# Patient Record
Sex: FEMALE | Race: WHITE | Employment: PART TIME | URBAN - METROPOLITAN AREA
[De-identification: names, ages, dates, MRNs, and addresses within clinical notes are randomized per-mention and may not be internally consistent; named-entity substitution may affect disease eponyms.]

---

## 2024-10-08 PROCEDURE — 99223 1ST HOSP IP/OBS HIGH 75: CPT | Performed by: STUDENT IN AN ORGANIZED HEALTH CARE EDUCATION/TRAINING PROGRAM

## 2024-10-15 ENCOUNTER — TELEPHONE (OUTPATIENT)
Dept: PRIMARY CARE | Facility: CLINIC | Age: 64
End: 2024-10-15
Payer: COMMERCIAL

## 2024-10-23 ENCOUNTER — APPOINTMENT (OUTPATIENT)
Dept: PRIMARY CARE | Facility: CLINIC | Age: 64
End: 2024-10-23
Payer: COMMERCIAL

## 2024-10-23 ENCOUNTER — LAB (OUTPATIENT)
Dept: LAB | Facility: LAB | Age: 64
End: 2024-10-23
Payer: COMMERCIAL

## 2024-10-23 VITALS
HEIGHT: 64 IN | DIASTOLIC BLOOD PRESSURE: 84 MMHG | WEIGHT: 164 LBS | HEART RATE: 87 BPM | BODY MASS INDEX: 28 KG/M2 | SYSTOLIC BLOOD PRESSURE: 132 MMHG | OXYGEN SATURATION: 96 %

## 2024-10-23 DIAGNOSIS — Z12.31 ENCOUNTER FOR SCREENING MAMMOGRAM FOR MALIGNANT NEOPLASM OF BREAST: ICD-10-CM

## 2024-10-23 DIAGNOSIS — J96.11 CHRONIC RESPIRATORY FAILURE WITH HYPOXIA (MULTI): ICD-10-CM

## 2024-10-23 DIAGNOSIS — Z00.00 ANNUAL PHYSICAL EXAM: Primary | ICD-10-CM

## 2024-10-23 DIAGNOSIS — R60.0 BILATERAL LOWER EXTREMITY EDEMA: ICD-10-CM

## 2024-10-23 DIAGNOSIS — Z12.11 ENCOUNTER FOR SCREENING FOR MALIGNANT NEOPLASM OF COLON: ICD-10-CM

## 2024-10-23 DIAGNOSIS — I26.99 OTHER ACUTE PULMONARY EMBOLISM WITHOUT ACUTE COR PULMONALE: ICD-10-CM

## 2024-10-23 DIAGNOSIS — J44.9 CHRONIC OBSTRUCTIVE PULMONARY DISEASE, UNSPECIFIED COPD TYPE (MULTI): ICD-10-CM

## 2024-10-23 DIAGNOSIS — Z00.00 ANNUAL PHYSICAL EXAM: ICD-10-CM

## 2024-10-23 DIAGNOSIS — Z12.4 CERVICAL CANCER SCREENING: ICD-10-CM

## 2024-10-23 DIAGNOSIS — Z78.0 ENCOUNTER FOR OSTEOPOROSIS SCREENING IN ASYMPTOMATIC POSTMENOPAUSAL PATIENT: ICD-10-CM

## 2024-10-23 DIAGNOSIS — Z13.820 ENCOUNTER FOR OSTEOPOROSIS SCREENING IN ASYMPTOMATIC POSTMENOPAUSAL PATIENT: ICD-10-CM

## 2024-10-23 LAB
ALBUMIN SERPL BCP-MCNC: 3.8 G/DL (ref 3.4–5)
ALP SERPL-CCNC: 94 U/L (ref 33–136)
ALT SERPL W P-5'-P-CCNC: 40 U/L (ref 7–45)
ANION GAP SERPL CALC-SCNC: 11 MMOL/L (ref 10–20)
AST SERPL W P-5'-P-CCNC: 21 U/L (ref 9–39)
BASOPHILS # BLD AUTO: 0.03 X10*3/UL (ref 0–0.1)
BASOPHILS NFR BLD AUTO: 0.4 %
BILIRUB SERPL-MCNC: 0.5 MG/DL (ref 0–1.2)
BUN SERPL-MCNC: 16 MG/DL (ref 6–23)
CALCIUM SERPL-MCNC: 9.7 MG/DL (ref 8.6–10.6)
CHLORIDE SERPL-SCNC: 102 MMOL/L (ref 98–107)
CHOLEST SERPL-MCNC: 259 MG/DL (ref 0–199)
CHOLESTEROL/HDL RATIO: 3.1
CO2 SERPL-SCNC: 31 MMOL/L (ref 21–32)
CREAT SERPL-MCNC: 0.86 MG/DL (ref 0.5–1.05)
EGFRCR SERPLBLD CKD-EPI 2021: 75 ML/MIN/1.73M*2
EOSINOPHIL # BLD AUTO: 0.22 X10*3/UL (ref 0–0.7)
EOSINOPHIL NFR BLD AUTO: 2.7 %
ERYTHROCYTE [DISTWIDTH] IN BLOOD BY AUTOMATED COUNT: 13.8 % (ref 11.5–14.5)
GLUCOSE SERPL-MCNC: 96 MG/DL (ref 74–99)
HCT VFR BLD AUTO: 39.5 % (ref 36–46)
HDLC SERPL-MCNC: 82.3 MG/DL
HGB BLD-MCNC: 12.3 G/DL (ref 12–16)
IMM GRANULOCYTES # BLD AUTO: 0.02 X10*3/UL (ref 0–0.7)
IMM GRANULOCYTES NFR BLD AUTO: 0.2 % (ref 0–0.9)
LDLC SERPL CALC-MCNC: 158 MG/DL
LYMPHOCYTES # BLD AUTO: 0.43 X10*3/UL (ref 1.2–4.8)
LYMPHOCYTES NFR BLD AUTO: 5.2 %
MCH RBC QN AUTO: 29.1 PG (ref 26–34)
MCHC RBC AUTO-ENTMCNC: 31.1 G/DL (ref 32–36)
MCV RBC AUTO: 93 FL (ref 80–100)
MONOCYTES # BLD AUTO: 0.8 X10*3/UL (ref 0.1–1)
MONOCYTES NFR BLD AUTO: 9.7 %
NEUTROPHILS # BLD AUTO: 6.79 X10*3/UL (ref 1.2–7.7)
NEUTROPHILS NFR BLD AUTO: 81.8 %
NON HDL CHOLESTEROL: 177 MG/DL (ref 0–149)
NRBC BLD-RTO: 0 /100 WBCS (ref 0–0)
PLATELET # BLD AUTO: 290 X10*3/UL (ref 150–450)
POTASSIUM SERPL-SCNC: 4.4 MMOL/L (ref 3.5–5.3)
PROT SERPL-MCNC: 6.3 G/DL (ref 6.4–8.2)
RBC # BLD AUTO: 4.23 X10*6/UL (ref 4–5.2)
SODIUM SERPL-SCNC: 140 MMOL/L (ref 136–145)
TRIGL SERPL-MCNC: 94 MG/DL (ref 0–149)
TSH SERPL-ACNC: 3.33 MIU/L (ref 0.44–3.98)
VLDL: 19 MG/DL (ref 0–40)
WBC # BLD AUTO: 8.3 X10*3/UL (ref 4.4–11.3)

## 2024-10-23 PROCEDURE — 90471 IMMUNIZATION ADMIN: CPT | Performed by: STUDENT IN AN ORGANIZED HEALTH CARE EDUCATION/TRAINING PROGRAM

## 2024-10-23 PROCEDURE — 1159F MED LIST DOCD IN RCRD: CPT | Performed by: STUDENT IN AN ORGANIZED HEALTH CARE EDUCATION/TRAINING PROGRAM

## 2024-10-23 PROCEDURE — 36415 COLL VENOUS BLD VENIPUNCTURE: CPT

## 2024-10-23 PROCEDURE — 80061 LIPID PANEL: CPT

## 2024-10-23 PROCEDURE — 80053 COMPREHEN METABOLIC PANEL: CPT

## 2024-10-23 PROCEDURE — 3008F BODY MASS INDEX DOCD: CPT | Performed by: STUDENT IN AN ORGANIZED HEALTH CARE EDUCATION/TRAINING PROGRAM

## 2024-10-23 PROCEDURE — 90677 PCV20 VACCINE IM: CPT | Performed by: STUDENT IN AN ORGANIZED HEALTH CARE EDUCATION/TRAINING PROGRAM

## 2024-10-23 PROCEDURE — 85025 COMPLETE CBC W/AUTO DIFF WBC: CPT

## 2024-10-23 PROCEDURE — 99387 INIT PM E/M NEW PAT 65+ YRS: CPT | Performed by: STUDENT IN AN ORGANIZED HEALTH CARE EDUCATION/TRAINING PROGRAM

## 2024-10-23 PROCEDURE — 1160F RVW MEDS BY RX/DR IN RCRD: CPT | Performed by: STUDENT IN AN ORGANIZED HEALTH CARE EDUCATION/TRAINING PROGRAM

## 2024-10-23 PROCEDURE — 1123F ACP DISCUSS/DSCN MKR DOCD: CPT | Performed by: STUDENT IN AN ORGANIZED HEALTH CARE EDUCATION/TRAINING PROGRAM

## 2024-10-23 PROCEDURE — 99204 OFFICE O/P NEW MOD 45 MIN: CPT | Performed by: STUDENT IN AN ORGANIZED HEALTH CARE EDUCATION/TRAINING PROGRAM

## 2024-10-23 PROCEDURE — 84443 ASSAY THYROID STIM HORMONE: CPT

## 2024-10-23 PROCEDURE — 1036F TOBACCO NON-USER: CPT | Performed by: STUDENT IN AN ORGANIZED HEALTH CARE EDUCATION/TRAINING PROGRAM

## 2024-10-23 RX ORDER — GUAIFENESIN 600 MG/1
600 TABLET, EXTENDED RELEASE ORAL 2 TIMES DAILY
COMMUNITY
Start: 2024-10-11

## 2024-10-23 RX ORDER — FUROSEMIDE 20 MG/1
20 TABLET ORAL DAILY
Qty: 5 TABLET | Refills: 0 | Status: SHIPPED | OUTPATIENT
Start: 2024-10-23 | End: 2024-10-28

## 2024-10-23 RX ORDER — ALBUTEROL SULFATE 90 UG/1
2 INHALANT RESPIRATORY (INHALATION) EVERY 6 HOURS
COMMUNITY
Start: 2024-10-11

## 2024-10-23 RX ORDER — FLUTICASONE FUROATE, UMECLIDINIUM BROMIDE AND VILANTEROL TRIFENATATE 100; 62.5; 25 UG/1; UG/1; UG/1
1 POWDER RESPIRATORY (INHALATION) DAILY
Qty: 60 EACH | Refills: 1 | Status: SHIPPED | OUTPATIENT
Start: 2024-10-23

## 2024-10-23 RX ORDER — APIXABAN 5 MG (74)
5 KIT ORAL 2 TIMES DAILY
COMMUNITY
Start: 2024-10-11 | End: 2024-10-23 | Stop reason: WASHOUT

## 2024-10-23 RX ORDER — FLUTICASONE FUROATE, UMECLIDINIUM BROMIDE AND VILANTEROL TRIFENATATE 100; 62.5; 25 UG/1; UG/1; UG/1
1 POWDER RESPIRATORY (INHALATION)
COMMUNITY
Start: 2024-10-11 | End: 2024-10-23 | Stop reason: SDUPTHER

## 2024-10-23 NOTE — PROGRESS NOTES
Subjective   Patient ID: Estephanie Reyes is a 63 y.o. female who presents for the following    Assessment/Plan   Preventative Medicine   -Has never had age related cancer screening  -Colon cancer screening: will order colonoscopy at [facility]   -MMG: MMG ordered   -Cervical cancer screening to be done through OB   -DEXA scan: never had done. Order placed.   -Prevnar 20 to be given today     COPD  - No acute exacerbation reported  - Currently well-controlled  - Patient quit smoking  Plan  - Continue Trelegy 100/65.5/25 1 puff daily  - Continue as needed albuterol  - Continue as needed DuoNebs    Tobacco use disorder  - Quit smoking recently  - 4 mm nodule seen on CT chest done in hospital in October 2024.  Follow-up recommended.    Liver lesion  - Favored to be hemangioma per radiology  - Discussed risks and benefits obtaining MRI to further characterize.  Patient is declining MRI at this time.    Right Sided PE without RHS   -Unprovoked  PLAN  -Continue eliquis  -Follow up in January 2025. Will repeat CTPE and see status of PE. Will consider discontinuation at that time and possibly pursue coagulopathy workup     BLLE Edema   -Lasix 20mg PO daily x 5 days  -Elevate legs  -Echo from Oct 2024 shows normal EF.  - Recent duplex ultrasound of bilateral lower extremities negative for DVT    Elevated blood pressure without diagnosis of hypertension  - Low-salt diet  - Monitor blood pressure at home  - Exercise as tolerated      HPI  63F presents to establish care and post hospital follow up.   Was recently admitted with RUL PE and COPD exacerbation. Was discharged on eliquis, trelegy, PRN albuterol, medrol dose pack and azithromycin.     Currently doing well. No CP, chest tightness, or worsening SOB. No black/bloody stools reported.     Denies fevers, chills, weight loss, lightheadedness, dizziness, vision changes, sore throat, runny nose, CP, SOB, cough, palpitations, n/v/d, abd pain, black/bloody stools, arthralgias, mood  "disturbance, or new numbness/weakness/tingling in arms/legs/face.      PMH: PE, COPD  Surgical Hx: none reported    Social HX  T: quit recently  A: Denies  D: denies     Social Drivers of Health     Tobacco Use: Medium Risk (10/23/2024)    Patient History     Smoking Tobacco Use: Former     Smokeless Tobacco Use: Never     Passive Exposure: Not on file   Alcohol Use: Not on file   Financial Resource Strain: Not on file   Food Insecurity: Not on file   Transportation Needs: Not on file   Physical Activity: Not on file   Stress: Not on file   Social Connections: Not on file   Intimate Partner Violence: Not on file   Depression: Not on file   Housing Stability: Not on file   Utilities: Not on file   Digital Equity: Not on file   Health Literacy: Not on file         Visit Vitals  /84   Pulse 87   Ht 1.626 m (5' 4\")   Wt 74.4 kg (164 lb)   SpO2 96%   BMI 28.15 kg/m²   Smoking Status Former   BSA 1.83 m²     PHYSICAL EXAM   Physical Exam       General: NAD. NCAT. Aox3   HEENT: PERRLA. EOMI. MMM. Nares patent bl.  Cardiovascular: RRR. No MRG. S1/S2 wnl.   Respiratory: CTABL. No acute respiratory distress.   GI: Soft, NT abdomen. BS present x 4.   MSK: ROM x 4. CTLS non-tender.   Extremities: 1+ BLLE edema. Cap refill < 2 sec.   Skin: No rashes or bruises.   Neuro: Aox3. Cranial Nerves grossly intact. Motor/sensory wnl.   Psych: Mood wnl.      REVIEW OF SYSTEMS   ROS IN HPI     No Known Allergies    Current Outpatient Medications   Medication Sig Dispense Refill    albuterol 90 mcg/actuation inhaler Inhale 2 puffs every 6 hours.      Eliquis DVT-PE Treat 30D Start 5 mg (74 tabs) tablet Take 1 tablet (5 mg) by mouth 2 times a day.      guaiFENesin (Mucinex) 600 mg 12 hr tablet Take 1 tablet (600 mg) by mouth 2 times a day.      Trelegy Ellipta 100-62.5-25 mcg blister with device Inhale 1 puff.       No current facility-administered medications for this visit.       Objective     No visits with results within 4 Month(s) " from this visit.   Latest known visit with results is:   No results found for any previous visit.       Radiology: Reviewed imaging in powerchart.  No results found.    No family history on file.  Social History     Socioeconomic History    Marital status:    Tobacco Use    Smoking status: Former     Types: Cigarettes    Smokeless tobacco: Never   Substance and Sexual Activity    Alcohol use: Yes     Comment: glass of wine occasionally     No past medical history on file.  No past surgical history on file.    Charting was completed using voice recognition technology and may include unintended errors.

## 2024-10-24 ENCOUNTER — TELEPHONE (OUTPATIENT)
Dept: PRIMARY CARE | Facility: CLINIC | Age: 64
End: 2024-10-24
Payer: COMMERCIAL

## 2024-10-24 NOTE — TELEPHONE ENCOUNTER
----- Message from Meryl Way sent at 10/24/2024  9:58 AM EDT -----  Cholesterol is elevated, but ASCVD risk is low.   Advise low fat, low carb diet.   Exercise at least 3x weekly for 30 minutes per session  Repeat in 6 months. If LDL goes up or ASCVD risk > 7.5%, will start statin.

## 2024-10-31 DIAGNOSIS — J44.9 CHRONIC OBSTRUCTIVE PULMONARY DISEASE, UNSPECIFIED COPD TYPE (MULTI): ICD-10-CM

## 2024-10-31 RX ORDER — FLUTICASONE FUROATE, UMECLIDINIUM BROMIDE AND VILANTEROL TRIFENATATE 100; 62.5; 25 UG/1; UG/1; UG/1
POWDER RESPIRATORY (INHALATION)
Qty: 60 EACH | Refills: 1 | OUTPATIENT
Start: 2024-10-31

## 2024-11-01 DIAGNOSIS — J44.9 CHRONIC OBSTRUCTIVE PULMONARY DISEASE, UNSPECIFIED COPD TYPE (MULTI): ICD-10-CM

## 2024-11-01 RX ORDER — FLUTICASONE FUROATE, UMECLIDINIUM BROMIDE AND VILANTEROL TRIFENATATE 100; 62.5; 25 UG/1; UG/1; UG/1
1 POWDER RESPIRATORY (INHALATION) DAILY
Qty: 60 EACH | Refills: 1 | Status: SHIPPED | OUTPATIENT
Start: 2024-11-01

## 2024-11-07 ENCOUNTER — HOSPITAL ENCOUNTER (OUTPATIENT)
Dept: RADIOLOGY | Facility: CLINIC | Age: 64
Discharge: HOME | End: 2024-11-07
Payer: COMMERCIAL

## 2024-11-07 VITALS — WEIGHT: 164 LBS | HEIGHT: 64 IN | BODY MASS INDEX: 28 KG/M2

## 2024-11-07 DIAGNOSIS — Z12.11 ENCOUNTER FOR SCREENING FOR MALIGNANT NEOPLASM OF COLON: ICD-10-CM

## 2024-11-07 PROCEDURE — 77067 SCR MAMMO BI INCL CAD: CPT | Performed by: RADIOLOGY

## 2024-11-07 PROCEDURE — 77063 BREAST TOMOSYNTHESIS BI: CPT | Performed by: RADIOLOGY

## 2024-11-07 PROCEDURE — 77067 SCR MAMMO BI INCL CAD: CPT

## 2024-11-11 ENCOUNTER — ANESTHESIA EVENT (OUTPATIENT)
Dept: GASTROENTEROLOGY | Facility: HOSPITAL | Age: 64
End: 2024-11-11
Payer: COMMERCIAL

## 2024-11-11 ENCOUNTER — ANESTHESIA (OUTPATIENT)
Dept: GASTROENTEROLOGY | Facility: HOSPITAL | Age: 64
End: 2024-11-11
Payer: COMMERCIAL

## 2024-11-11 ENCOUNTER — HOSPITAL ENCOUNTER (OUTPATIENT)
Dept: GASTROENTEROLOGY | Facility: HOSPITAL | Age: 64
Discharge: HOME | End: 2024-11-11
Payer: COMMERCIAL

## 2024-11-11 VITALS
OXYGEN SATURATION: 97 % | SYSTOLIC BLOOD PRESSURE: 123 MMHG | WEIGHT: 163.14 LBS | DIASTOLIC BLOOD PRESSURE: 75 MMHG | BODY MASS INDEX: 27.85 KG/M2 | TEMPERATURE: 98.1 F | HEART RATE: 56 BPM | HEIGHT: 64 IN | RESPIRATION RATE: 16 BRPM

## 2024-11-11 DIAGNOSIS — Z12.11 ENCOUNTER FOR SCREENING FOR MALIGNANT NEOPLASM OF COLON: ICD-10-CM

## 2024-11-11 PROCEDURE — 45385 COLONOSCOPY W/LESION REMOVAL: CPT | Performed by: INTERNAL MEDICINE

## 2024-11-11 PROCEDURE — A45385 PR COLONOSCOPY,REMV LESN,SNARE: Performed by: NURSE ANESTHETIST, CERTIFIED REGISTERED

## 2024-11-11 PROCEDURE — 3700000002 HC GENERAL ANESTHESIA TIME - EACH INCREMENTAL 1 MINUTE

## 2024-11-11 PROCEDURE — A45385 PR COLONOSCOPY,REMV LESN,SNARE: Performed by: ANESTHESIOLOGY

## 2024-11-11 PROCEDURE — 7100000009 HC PHASE TWO TIME - INITIAL BASE CHARGE

## 2024-11-11 PROCEDURE — 2500000004 HC RX 250 GENERAL PHARMACY W/ HCPCS (ALT 636 FOR OP/ED): Performed by: NURSE ANESTHETIST, CERTIFIED REGISTERED

## 2024-11-11 PROCEDURE — 3700000001 HC GENERAL ANESTHESIA TIME - INITIAL BASE CHARGE

## 2024-11-11 PROCEDURE — 7100000010 HC PHASE TWO TIME - EACH INCREMENTAL 1 MINUTE

## 2024-11-11 PROCEDURE — 45380 COLONOSCOPY AND BIOPSY: CPT | Performed by: INTERNAL MEDICINE

## 2024-11-11 RX ORDER — PROPOFOL 10 MG/ML
INJECTION, EMULSION INTRAVENOUS AS NEEDED
Status: DISCONTINUED | OUTPATIENT
Start: 2024-11-11 | End: 2024-11-11

## 2024-11-11 RX ORDER — LIDOCAINE HCL/PF 100 MG/5ML
SYRINGE (ML) INTRAVENOUS AS NEEDED
Status: DISCONTINUED | OUTPATIENT
Start: 2024-11-11 | End: 2024-11-11

## 2024-11-11 SDOH — HEALTH STABILITY: MENTAL HEALTH: CURRENT SMOKER: 0

## 2024-11-11 ASSESSMENT — COLUMBIA-SUICIDE SEVERITY RATING SCALE - C-SSRS
2. HAVE YOU ACTUALLY HAD ANY THOUGHTS OF KILLING YOURSELF?: NO
6. HAVE YOU EVER DONE ANYTHING, STARTED TO DO ANYTHING, OR PREPARED TO DO ANYTHING TO END YOUR LIFE?: NO
1. IN THE PAST MONTH, HAVE YOU WISHED YOU WERE DEAD OR WISHED YOU COULD GO TO SLEEP AND NOT WAKE UP?: NO

## 2024-11-11 ASSESSMENT — PAIN - FUNCTIONAL ASSESSMENT
PAIN_FUNCTIONAL_ASSESSMENT: 0-10
PAIN_FUNCTIONAL_ASSESSMENT: UNABLE TO SELF-REPORT

## 2024-11-11 ASSESSMENT — PAIN SCALES - GENERAL
PAINLEVEL_OUTOF10: 0 - NO PAIN
PAIN_LEVEL: 0
PAINLEVEL_OUTOF10: 0 - NO PAIN

## 2024-11-11 ASSESSMENT — ENCOUNTER SYMPTOMS
RESPIRATORY NEGATIVE: 1
CARDIOVASCULAR NEGATIVE: 1
GASTROINTESTINAL NEGATIVE: 1
CONSTITUTIONAL NEGATIVE: 1
EYES NEGATIVE: 1

## 2024-11-11 NOTE — ANESTHESIA POSTPROCEDURE EVALUATION
Patient: Estephanie Reyes    Procedure Summary       Date: 11/11/24 Room / Location: [facility]    Anesthesia Start: 0947 Anesthesia Stop: 1015    Procedure: COLONOSCOPY Diagnosis: Encounter for screening for malignant neoplasm of colon    Scheduled Providers: Cristhian Arthur MD; Bishnu Nieto MD Responsible Provider: Jefry Nascimento MD    Anesthesia Type: MAC ASA Status: 3            Anesthesia Type: MAC    Vitals Value Taken Time   /65 11/11/24 1013   Temp 36.7 °C (98.1 °F) 11/11/24 1010   Pulse 79 11/11/24 1013   Resp 16 11/11/24 1010   SpO2 97 % 11/11/24 1013   Vitals shown include unfiled device data.    Anesthesia Post Evaluation    Patient location during evaluation: PACU  Patient participation: complete - patient participated  Level of consciousness: awake and alert  Pain score: 0  Pain management: adequate  Airway patency: patent  Cardiovascular status: acceptable  Respiratory status: acceptable  Hydration status: acceptable  Postoperative Nausea and Vomiting: none        No notable events documented.

## 2024-11-11 NOTE — ANESTHESIA PREPROCEDURE EVALUATION
Patient: Estephanie Reyes    Procedure Information       Date/Time: 11/11/24 1000    Scheduled providers: Cristhian Arthur MD; Bishnu Nieto MD    Procedure: COLONOSCOPY    Location: [facility]            Relevant Problems   Anesthesia (within normal limits)       Clinical information reviewed:   Tobacco  Allergies  Meds   Med Hx  Surg Hx   Fam Hx  Soc Hx        NPO Detail:  NPO/Void Status  Date of Last Liquid: 11/11/24  Time of Last Liquid: 0300  Date of Last Solid: 11/09/24  Time of Last Solid: 1700  Last Intake Type: Food  Time of Last Void: 0853         Physical Exam    Airway  Mallampati: II  TM distance: >3 FB  Neck ROM: full     Cardiovascular   Rhythm: regular  Rate: normal     Dental - normal exam  (+) lower dentures, upper dentures     Pulmonary    Abdominal        Anesthesia Plan    History of general anesthesia?: yes  History of complications of general anesthesia?: no    ASA 3     MAC     The patient is not a current smoker.  Patient was not previously instructed to abstain from smoking on day of procedure.  Patient did not smoke on day of procedure.    intravenous induction   Anesthetic plan and risks discussed with patient.  Use of blood products discussed with patient who.    Plan discussed with CRNA.

## 2024-11-11 NOTE — H&P
"History Of Present Illness  Estephanie Reyes is a 63 y.o. female presenting for screening colonosocpy.     Past Medical History  History reviewed. No pertinent past medical history.  Surgical History  History reviewed. No pertinent surgical history.  Social History  She reports that she has quit smoking. Her smoking use included cigarettes. She has never used smokeless tobacco. She reports current alcohol use. No history on file for drug use.    Family History  No family history on file.     Allergies  No Known Allergies  Review of Systems   Constitutional: Negative.    HENT: Negative.     Eyes: Negative.    Respiratory: Negative.     Cardiovascular: Negative.    Gastrointestinal: Negative.         Physical Exam  Constitutional:       Appearance: Normal appearance.   Eyes:      Pupils: Pupils are equal, round, and reactive to light.   Cardiovascular:      Rate and Rhythm: Normal rate.   Pulmonary:      Effort: Pulmonary effort is normal.   Abdominal:      General: Abdomen is flat. Bowel sounds are normal.      Palpations: Abdomen is soft.   Neurological:      Mental Status: She is alert.          Last Recorded Vitals  Blood pressure 140/76, pulse 83, temperature 36.7 °C (98.1 °F), temperature source Temporal, resp. rate 18, height 1.626 m (5' 4.02\"), weight 74 kg (163 lb 2.3 oz), SpO2 95%.    Assessment/Plan   Colonoscopy      Cristhian Arthur MD  "

## 2024-11-12 ENCOUNTER — PATIENT MESSAGE (OUTPATIENT)
Dept: PRIMARY CARE | Facility: CLINIC | Age: 64
End: 2024-11-12
Payer: COMMERCIAL

## 2024-11-12 ENCOUNTER — TELEPHONE (OUTPATIENT)
Dept: PRIMARY CARE | Facility: CLINIC | Age: 64
End: 2024-11-12
Payer: COMMERCIAL

## 2024-11-12 DIAGNOSIS — R92.8 ABNORMAL MAMMOGRAM: ICD-10-CM

## 2024-11-12 ASSESSMENT — PAIN SCALES - GENERAL: PAINLEVEL_OUTOF10: 0 - NO PAIN

## 2024-11-12 NOTE — TELEPHONE ENCOUNTER
----- Message from Meryl Way sent at 11/11/2024  6:45 PM EST -----  Repeat in 5 years.   Pathology is pending, which GI will follow up with her when resulted.

## 2024-11-12 NOTE — TELEPHONE ENCOUNTER
----- Message from Meryl Way sent at 11/11/2024  6:41 PM EST -----  Abnormal MMG.   Please order BL breast spot compression MMG and BL breast US  Dx: abnormal MMG     Please advise patient of orders

## 2024-11-14 LAB
LABORATORY COMMENT REPORT: NORMAL
PATH REPORT.FINAL DX SPEC: NORMAL
PATH REPORT.GROSS SPEC: NORMAL
PATH REPORT.RELEVANT HX SPEC: NORMAL
PATH REPORT.TOTAL CANCER: NORMAL

## 2024-11-23 ENCOUNTER — HOSPITAL ENCOUNTER (OUTPATIENT)
Dept: RADIOLOGY | Facility: CLINIC | Age: 64
Discharge: HOME | End: 2024-11-23
Payer: COMMERCIAL

## 2024-11-23 DIAGNOSIS — Z13.820 ENCOUNTER FOR OSTEOPOROSIS SCREENING IN ASYMPTOMATIC POSTMENOPAUSAL PATIENT: ICD-10-CM

## 2024-11-23 DIAGNOSIS — Z78.0 ENCOUNTER FOR OSTEOPOROSIS SCREENING IN ASYMPTOMATIC POSTMENOPAUSAL PATIENT: ICD-10-CM

## 2024-11-23 PROCEDURE — 77080 DXA BONE DENSITY AXIAL: CPT | Performed by: RADIOLOGY

## 2024-11-23 PROCEDURE — 77080 DXA BONE DENSITY AXIAL: CPT

## 2024-11-26 ENCOUNTER — TELEPHONE (OUTPATIENT)
Dept: PRIMARY CARE | Facility: CLINIC | Age: 64
End: 2024-11-26
Payer: COMMERCIAL

## 2024-11-26 NOTE — TELEPHONE ENCOUNTER
----- Message from Meryl Way sent at 11/25/2024 11:03 PM EST -----  Has osteopenia, close to osteoporosis in L spine.   Recommend daily OTC Vitamin D and Calcium supplement.   Repeat in 2 years

## 2024-12-18 ENCOUNTER — APPOINTMENT (OUTPATIENT)
Dept: PRIMARY CARE | Facility: CLINIC | Age: 64
End: 2024-12-18
Payer: COMMERCIAL

## 2024-12-18 VITALS
SYSTOLIC BLOOD PRESSURE: 126 MMHG | BODY MASS INDEX: 30.9 KG/M2 | HEIGHT: 64 IN | OXYGEN SATURATION: 95 % | DIASTOLIC BLOOD PRESSURE: 78 MMHG | WEIGHT: 181 LBS | HEART RATE: 74 BPM

## 2024-12-18 DIAGNOSIS — M25.561 RECURRENT PAIN OF RIGHT KNEE: Primary | ICD-10-CM

## 2024-12-18 DIAGNOSIS — I87.8 VENOUS STASIS: ICD-10-CM

## 2024-12-18 DIAGNOSIS — J44.9 CHRONIC OBSTRUCTIVE PULMONARY DISEASE, UNSPECIFIED COPD TYPE (MULTI): ICD-10-CM

## 2024-12-18 DIAGNOSIS — I26.99 OTHER ACUTE PULMONARY EMBOLISM WITHOUT ACUTE COR PULMONALE: ICD-10-CM

## 2024-12-18 PROCEDURE — 1036F TOBACCO NON-USER: CPT | Performed by: STUDENT IN AN ORGANIZED HEALTH CARE EDUCATION/TRAINING PROGRAM

## 2024-12-18 PROCEDURE — 99214 OFFICE O/P EST MOD 30 MIN: CPT | Performed by: STUDENT IN AN ORGANIZED HEALTH CARE EDUCATION/TRAINING PROGRAM

## 2024-12-18 PROCEDURE — 1160F RVW MEDS BY RX/DR IN RCRD: CPT | Performed by: STUDENT IN AN ORGANIZED HEALTH CARE EDUCATION/TRAINING PROGRAM

## 2024-12-18 PROCEDURE — 3008F BODY MASS INDEX DOCD: CPT | Performed by: STUDENT IN AN ORGANIZED HEALTH CARE EDUCATION/TRAINING PROGRAM

## 2024-12-18 PROCEDURE — 1159F MED LIST DOCD IN RCRD: CPT | Performed by: STUDENT IN AN ORGANIZED HEALTH CARE EDUCATION/TRAINING PROGRAM

## 2024-12-18 PROCEDURE — 1123F ACP DISCUSS/DSCN MKR DOCD: CPT | Performed by: STUDENT IN AN ORGANIZED HEALTH CARE EDUCATION/TRAINING PROGRAM

## 2024-12-18 RX ORDER — DICLOFENAC SODIUM 10 MG/G
4 GEL TOPICAL 4 TIMES DAILY
Qty: 100 G | Refills: 1 | Status: SHIPPED | OUTPATIENT
Start: 2024-12-18

## 2024-12-18 RX ORDER — FLUTICASONE FUROATE, UMECLIDINIUM BROMIDE AND VILANTEROL TRIFENATATE 100; 62.5; 25 UG/1; UG/1; UG/1
1 POWDER RESPIRATORY (INHALATION) DAILY
Qty: 60 EACH | Refills: 1 | Status: SHIPPED | OUTPATIENT
Start: 2024-12-18

## 2024-12-18 RX ORDER — ALBUTEROL SULFATE 90 UG/1
2 INHALANT RESPIRATORY (INHALATION) EVERY 6 HOURS PRN
Qty: 18 G | Refills: 2 | Status: SHIPPED | OUTPATIENT
Start: 2024-12-18

## 2024-12-18 RX ORDER — METHYLPREDNISOLONE 4 MG/1
TABLET ORAL
Qty: 21 TABLET | Refills: 0 | Status: SHIPPED | OUTPATIENT
Start: 2024-12-18 | End: 2024-12-24

## 2024-12-18 NOTE — PROGRESS NOTES
Subjective   Patient ID: Estephanie Reyes is a 64 y.o. female who presents for the following    Assessment/Plan   Preventative Medicine   -Prevnar done in Nov 2024. Declining flu shot   -Colonoscopy done in Nov 2024: 3 sessile polyps removed. Repeat in 5 years.   -MMG: MMG showed BL asymmetries. Spot compression and US pending for Jan 2025  -Cervical cancer screening to be done through OB   -DEXA scan: shows osteopenia. Continue daily Vitamin D and calcium     COPD  - No acute exacerbation reported  - Currently well-controlled  - Patient quit smoking  Plan  - Continue Trelegy 100/65.5/25 1 puff daily  - Continue as needed albuterol  - Continue as needed DuoNebs    Tobacco use disorder  - Quit smoking recently  - 4 mm nodule seen on CT chest done in hospital in October 2024.  Follow-up recommended. Repeat LD CT next year in Oct 2025    Liver lesion  - Favored to be hemangioma per radiology  - Discussed risks and benefits obtaining MRI to further characterize.  Patient is declining MRI at this time.    Right Sided PE without RHS   -Unprovoked  PLAN  -Continue eliquis  -Follow up in 6 months.  Will repeat CTPE and see status of PE. Will consider discontinuation at that time and possibly pursue coagulopathy workup     BLLE Edema   -improved  -likely chronic venous stasis  -Elevate legs  -Echo from Oct 2024 shows normal EF.  -Recent duplex ultrasound of bilateral lower extremities negative for DVT    R Knee Pain likley arthritis   -No trauma reported  -tender over anterior knee  -Voltaren topical and medrol dose pack rx given     Elevated blood pressure without diagnosis of hypertension  - Low-salt diet  - Monitor blood pressure at home  - Exercise as tolerated      Knee Pain     64F presents for follow up.   Had colonoscopy done in November 2024 with 3 sessile polyps removed.  Repeat recommended in 5 years    Mammogram was done earlier this year which showed bilateral asymmetries and spot compression and ultrasound are  "ordered for January 2025.  Lastly had DEXA scan done which showed osteopenia.  Patient is currently taking daily calcium and vitamin D supplementations    Discussed bilateral lower extremity edema which is likely secondary to venous stasis.  Patient did have duplex of the bilateral lower extremities done within the past couple months and there has been really no change in edema and she has been compliant with her Eliquis daily.    Lastly complaining of right knee pain which is likely secondary to arthritis.  No trauma reported.  Discussed plan for imaging versus joint injection and patient would like to try topical medication right now with steroids and readdress.    Addressed elevated blood pressure and patient is attempting to adhere to a low-salt diet.  Currently not on any medication.    Currently doing well. No CP, chest tightness, or worsening SOB. No black/bloody stools reported.     Denies fevers, chills, weight loss, lightheadedness, dizziness, vision changes, sore throat, runny nose, CP, SOB, cough, palpitations, n/v/d, abd pain, black/bloody stools, mood disturbance, or new numbness/weakness/tingling in arms/legs/face.      PMH: PE, COPD  Surgical Hx: none reported    Social HX  T: quit recently  A: Denies  D: denies     Social Drivers of Health     Tobacco Use: Medium Risk (12/18/2024)    Patient History     Smoking Tobacco Use: Former     Smokeless Tobacco Use: Never     Passive Exposure: Not on file   Alcohol Use: Not on file   Financial Resource Strain: Not on file   Food Insecurity: Not on file   Transportation Needs: Not on file   Physical Activity: Not on file   Stress: Not on file   Social Connections: Not on file   Intimate Partner Violence: Not on file   Depression: Not on file   Housing Stability: Not on file   Utilities: Not on file   Digital Equity: Not on file   Health Literacy: Not on file         Visit Vitals  /78   Pulse 74   Ht 1.626 m (5' 4\")   Wt 82.1 kg (181 lb)   SpO2 95%   BMI " 31.07 kg/m²   OB Status Postmenopausal   Smoking Status Former   BSA 1.93 m²     PHYSICAL EXAM   Physical Exam       General: NAD. NCAT. Aox3   HEENT: PERRLA. EOMI. MMM. Nares patent bl.  Cardiovascular: RRR. No MRG. S1/S2 wnl.   Respiratory: CTABL. No acute respiratory distress.   GI: Soft, NT abdomen. BS present x 4.   MSK: ROM x 4. CTLS non-tender.   Extremities: 1+ BLLE edema with venous stasis superficial veins seen. Cap refill < 2 sec.   Skin: No rashes or bruises.   Neuro: Aox3. Cranial Nerves grossly intact. Motor/sensory wnl.   Psych: Mood wnl.      REVIEW OF SYSTEMS   ROS IN HPI     No Known Allergies    Current Outpatient Medications   Medication Sig Dispense Refill    albuterol 90 mcg/actuation inhaler Inhale 2 puffs every 6 hours if needed.      apixaban (Eliquis) 5 mg tablet Take 2 tablets (10 mg) by mouth 2 times a day. 120 tablet 1    guaiFENesin (Mucinex) 600 mg 12 hr tablet Take 1 tablet (600 mg) by mouth 2 times a day.      Trelegy Ellipta 100-62.5-25 mcg blister with device Inhale 1 puff once daily. 60 each 1    furosemide (Lasix) 20 mg tablet Take 1 tablet (20 mg) by mouth once daily for 5 days. (Patient not taking: Reported on 11/11/2024) 5 tablet 0     No current facility-administered medications for this visit.       Objective     Hospital Outpatient Visit on 11/11/2024   Component Date Value Ref Range Status    Case Report 11/11/2024    Final                    Value:Surgical Pathology                                Case: [id number]                                  Authorizing Provider:  Cristhian Arthur MD         Collected:           11/11/2024 0959              Ordering Location:     [facility]    Received:            11/11/2024 1102              Pathologist:           Jordan Remy MD                                                                  Specimens:   A) - COLON - TRANSVERSE POLYP, COLD BIOPSY                                                          B) - ASCENDING COLON  "POLYP, COLD SNARE                                                              C) - RECTUM POLYPECTOMY, COLD SNARE                                                        FINAL DIAGNOSIS 11/11/2024    Final                    Value:A. TRANSVERSE COLON POLYP:   -- COLONIC MUCOSA WITH HYPERPLASTIC TYPE CHANGES  -- NO DYSPLASTIC EPITHELIUM IDENTIFIED     B. ASCENDING COLON POLYP:   -- TUBULAR ADENOMA     C. RECTUM POLYP:      -- HYPERPLASTIC POLYP         11/11/2024    Final                    Value:By the signature on this report, the individual or group listed as making the Final Interpretation/Diagnosis certifies that they have reviewed this case.       Clinical History 11/11/2024    Final                    Value:Diagnosis: Z12.11 - Encounter for screening for malignant neoplasm of colon [ICD-10-CM]      Gross Description 11/11/2024    Final                    Value:A: Received in formalin, labeled with the patient's name and hospital number and \"1, cold BX.\", are multiple fragments of tan, soft tissue aggregating to 1.0 x 0.2 x 0.2 cm. The specimen is submitted in toto in one cassette.  [facility]  B: Received in formalin, labeled with the patient's name and hospital number and \"2, cold snare\", are 2 fragments of tan, soft tissue aggregating to 0.8 x 0.3 x 0.2 cm. The specimen is submitted in toto in one cassette.  [facility]  C: Received in formalin, labeled with the patient's name and hospital number and \"3, cold snare\", is a fragment of tan, soft tissue measuring 0.3 x 0.2 x 0.2 cm. The specimen is submitted in toto in one cassette.  M[facility]     Lab on 10/23/2024   Component Date Value Ref Range Status    WBC 10/23/2024 8.3  4.4 - 11.3 x10*3/uL Final    nRBC 10/23/2024 0.0  0.0 - 0.0 /100 WBCs Final    RBC 10/23/2024 4.23  4.00 - 5.20 x10*6/uL Final    Hemoglobin 10/23/2024 12.3  12.0 - 16.0 g/dL Final    Hematocrit 10/23/2024 39.5  36.0 - 46.0 % Final    MCV 10/23/2024 93  80 - 100 fL Final    MCH 10/23/2024 29.1  26.0 - 34.0 pg " Final    MCHC 10/23/2024 31.1 (L)  32.0 - 36.0 g/dL Final    RDW 10/23/2024 13.8  11.5 - 14.5 % Final    Platelets 10/23/2024 290  150 - 450 x10*3/uL Final    Neutrophils % 10/23/2024 81.8  40.0 - 80.0 % Final    Immature Granulocytes %, Automated 10/23/2024 0.2  0.0 - 0.9 % Final    Lymphocytes % 10/23/2024 5.2  13.0 - 44.0 % Final    Monocytes % 10/23/2024 9.7  2.0 - 10.0 % Final    Eosinophils % 10/23/2024 2.7  0.0 - 6.0 % Final    Basophils % 10/23/2024 0.4  0.0 - 2.0 % Final    Neutrophils Absolute 10/23/2024 6.79  1.20 - 7.70 x10*3/uL Final    Immature Granulocytes Absolute, Au* 10/23/2024 0.02  0.00 - 0.70 x10*3/uL Final    Lymphocytes Absolute 10/23/2024 0.43 (L)  1.20 - 4.80 x10*3/uL Final    Monocytes Absolute 10/23/2024 0.80  0.10 - 1.00 x10*3/uL Final    Eosinophils Absolute 10/23/2024 0.22  0.00 - 0.70 x10*3/uL Final    Basophils Absolute 10/23/2024 0.03  0.00 - 0.10 x10*3/uL Final    Glucose 10/23/2024 96  74 - 99 mg/dL Final    Sodium 10/23/2024 140  136 - 145 mmol/L Final    Potassium 10/23/2024 4.4  3.5 - 5.3 mmol/L Final    Chloride 10/23/2024 102  98 - 107 mmol/L Final    Bicarbonate 10/23/2024 31  21 - 32 mmol/L Final    Anion Gap 10/23/2024 11  10 - 20 mmol/L Final    Urea Nitrogen 10/23/2024 16  6 - 23 mg/dL Final    Creatinine 10/23/2024 0.86  0.50 - 1.05 mg/dL Final    eGFR 10/23/2024 75  >60 mL/min/1.73m*2 Final    Calcium 10/23/2024 9.7  8.6 - 10.6 mg/dL Final    Albumin 10/23/2024 3.8  3.4 - 5.0 g/dL Final    Alkaline Phosphatase 10/23/2024 94  33 - 136 U/L Final    Total Protein 10/23/2024 6.3 (L)  6.4 - 8.2 g/dL Final    AST 10/23/2024 21  9 - 39 U/L Final    Bilirubin, Total 10/23/2024 0.5  0.0 - 1.2 mg/dL Final    ALT 10/23/2024 40  7 - 45 U/L Final    Cholesterol 10/23/2024 259 (H)  0 - 199 mg/dL Final    HDL-Cholesterol 10/23/2024 82.3  mg/dL Final    Cholesterol/HDL Ratio 10/23/2024 3.1   Final    LDL Calculated 10/23/2024 158 (H)  <=99 mg/dL Final    VLDL 10/23/2024 19  0 - 40  mg/dL Final    Triglycerides 10/23/2024 94  0 - 149 mg/dL Final    Non HDL Cholesterol 10/23/2024 177 (H)  0 - 149 mg/dL Final    Thyroid Stimulating Hormone 10/23/2024 3.33  0.44 - 3.98 mIU/L Final       Radiology: Reviewed imaging in powerchart.  No results found.    No family history on file.  Social History     Socioeconomic History    Marital status:    Tobacco Use    Smoking status: Former     Types: Cigarettes    Smokeless tobacco: Never   Substance and Sexual Activity    Alcohol use: Yes     Comment: glass of wine occasionally     No past medical history on file.  No past surgical history on file.    Charting was completed using voice recognition technology and may include unintended errors.

## 2024-12-26 ENCOUNTER — TELEPHONE (OUTPATIENT)
Dept: PRIMARY CARE | Facility: CLINIC | Age: 64
End: 2024-12-26
Payer: COMMERCIAL

## 2025-01-30 ENCOUNTER — HOSPITAL ENCOUNTER (OUTPATIENT)
Dept: RADIOLOGY | Facility: CLINIC | Age: 65
Discharge: HOME | End: 2025-01-30
Payer: COMMERCIAL

## 2025-01-30 ENCOUNTER — TELEPHONE (OUTPATIENT)
Dept: PRIMARY CARE | Facility: CLINIC | Age: 65
End: 2025-01-30

## 2025-01-30 DIAGNOSIS — R92.8 ABNORMAL MAMMOGRAM: ICD-10-CM

## 2025-01-30 PROCEDURE — 76642 ULTRASOUND BREAST LIMITED: CPT | Mod: RT

## 2025-01-30 PROCEDURE — 77062 BREAST TOMOSYNTHESIS BI: CPT

## 2025-01-30 NOTE — TELEPHONE ENCOUNTER
----- Message from Meryl Way sent at 1/30/2025  3:14 PM EST -----  Likely benign.   Recommending repeat diagnostic MMG in 6 months.   Advise patient to call in June 2025 for new order or alternatively can give order for diagnostic MMG with future date of July 2025.

## 2025-02-13 DIAGNOSIS — I26.99 OTHER ACUTE PULMONARY EMBOLISM WITHOUT ACUTE COR PULMONALE: ICD-10-CM

## 2025-02-13 DIAGNOSIS — J96.11 CHRONIC RESPIRATORY FAILURE WITH HYPOXIA (MULTI): ICD-10-CM

## 2025-02-13 DIAGNOSIS — J44.9 CHRONIC OBSTRUCTIVE PULMONARY DISEASE, UNSPECIFIED COPD TYPE (MULTI): ICD-10-CM

## 2025-02-13 RX ORDER — IPRATROPIUM BROMIDE AND ALBUTEROL SULFATE 2.5; .5 MG/3ML; MG/3ML
SOLUTION RESPIRATORY (INHALATION)
Qty: 180 ML | Refills: 1 | Status: SHIPPED | OUTPATIENT
Start: 2025-02-13

## 2025-02-13 RX ORDER — APIXABAN 5 MG/1
TABLET, FILM COATED ORAL
Qty: 120 TABLET | Refills: 1 | Status: SHIPPED | OUTPATIENT
Start: 2025-02-13

## 2025-02-28 DIAGNOSIS — J44.9 CHRONIC OBSTRUCTIVE PULMONARY DISEASE, UNSPECIFIED COPD TYPE (MULTI): ICD-10-CM

## 2025-02-28 RX ORDER — FLUTICASONE FUROATE, UMECLIDINIUM BROMIDE AND VILANTEROL TRIFENATATE 100; 62.5; 25 UG/1; UG/1; UG/1
1 POWDER RESPIRATORY (INHALATION) DAILY
Qty: 60 EACH | Refills: 1 | Status: SHIPPED | OUTPATIENT
Start: 2025-02-28

## 2025-04-18 ENCOUNTER — APPOINTMENT (OUTPATIENT)
Dept: PRIMARY CARE | Facility: CLINIC | Age: 65
End: 2025-04-18
Payer: COMMERCIAL

## 2025-04-18 VITALS
WEIGHT: 200 LBS | HEIGHT: 64 IN | DIASTOLIC BLOOD PRESSURE: 82 MMHG | HEART RATE: 89 BPM | OXYGEN SATURATION: 95 % | SYSTOLIC BLOOD PRESSURE: 132 MMHG | BODY MASS INDEX: 34.15 KG/M2

## 2025-04-18 DIAGNOSIS — J44.9 CHRONIC OBSTRUCTIVE PULMONARY DISEASE, UNSPECIFIED COPD TYPE (MULTI): ICD-10-CM

## 2025-04-18 DIAGNOSIS — L98.9 SKIN LESION OF LEFT ARM: ICD-10-CM

## 2025-04-18 DIAGNOSIS — R92.8 ABNORMAL MAMMOGRAM: Primary | ICD-10-CM

## 2025-04-18 DIAGNOSIS — I26.99 OTHER ACUTE PULMONARY EMBOLISM WITHOUT ACUTE COR PULMONALE: ICD-10-CM

## 2025-04-18 DIAGNOSIS — R63.5 WEIGHT GAIN: ICD-10-CM

## 2025-04-18 DIAGNOSIS — R53.83 OTHER FATIGUE: ICD-10-CM

## 2025-04-18 RX ORDER — FLUTICASONE FUROATE, UMECLIDINIUM BROMIDE AND VILANTEROL TRIFENATATE 100; 62.5; 25 UG/1; UG/1; UG/1
1 POWDER RESPIRATORY (INHALATION) DAILY
Qty: 60 EACH | Refills: 1 | Status: SHIPPED | OUTPATIENT
Start: 2025-04-18

## 2025-04-18 RX ORDER — ALBUTEROL SULFATE 90 UG/1
2 INHALANT RESPIRATORY (INHALATION) EVERY 6 HOURS PRN
Qty: 18 G | Refills: 2 | Status: SHIPPED | OUTPATIENT
Start: 2025-04-18

## 2025-04-18 NOTE — PROGRESS NOTES
Subjective   Patient ID: Estephanie Reyes is a 64 y.o. female who presents for the following    Assessment/Plan   Preventative Medicine   -Prevnar done in Nov 2024. Declining flu shot   -Colonoscopy done in Nov 2024: 3 sessile polyps removed. Repeat in 5 years.   -MMG: MMG showed BL asymmetries. Spot compression/US recommending repeat in July 2025. Order given for diagnostic MMG today (April 18, 2025)  -Cervical cancer screening to be done through OB   -DEXA scan: shows osteopenia. Continue daily Vitamin D and calcium     Fatigue  Cold intolerance  Weight gain  -TSH normal last year   -19lb weight gain in 4 months without significant dietary changes  PLAN  -Check CBC, TSH  -Advise healthy eating habits including low salt, low carb, low fat diet.     L Arm Lesion  -Ongoing for 1 year  -Possible BCC vs SCC   PLAN  -Refer to dermatology     COPD  - No acute exacerbation reported  - Currently well-controlled  - Patient quit smoking in Oct 2024  Plan  - Continue Trelegy 100/65.5/25 1 puff daily  - Continue as needed albuterol  - Continue as needed Nannette    Tobacco use disorder  - Quit smoking recently in Oct 2024  - 4 mm nodule seen on CT chest done in hospital in October 2024.  Follow-up recommended. Repeat LD CT next year in Oct 2025    Liver lesion  - Favored to be hemangioma per radiology  - Discussed risks and benefits obtaining MRI to further characterize.  Patient is declining MRI at this time.    Right Sided PE without RHS   -Unprovoked  PLAN  -Continue eliquis    BLLE Edema - improving  -improved  -likely chronic venous stasis  -Elevate legs  -Echo from Oct 2024 shows normal EF.  -Recent duplex ultrasound of bilateral lower extremities negative for DVT    R Knee Pain likley arthritis   -No trauma reported  -tender over anterior knee  -Voltaren topical and medrol dose pack rx given     Elevated blood pressure without diagnosis of hypertension  - Low-salt diet  - Monitor blood pressure at home  - Exercise as  "tolerated    Refills given.       Knee Pain     64F presents for follow up.   Doing well.   SOB stable on Trelegy. Quit smoking in Oct 2024.   No chest pain, worsening SOB/JAFFE.     C/O tiredness, fatigue, cold intolerance. No nighttime snoring reported.     Has a lesion on LUE that has been present for 1 year. Frquently opens up and drains. Is non-healing. Has some associated burning in the area.     Addressed elevated blood pressure and patient is attempting to adhere to a low-salt diet.  Currently not on any medication.    Currently doing well. No CP, chest tightness, or worsening SOB. No black/bloody stools reported.     Denies fevers, chills, weight loss, lightheadedness, dizziness, vision changes, sore throat, runny nose, CP, SOB, cough, palpitations, n/v/d, abd pain, black/bloody stools, mood disturbance, or new numbness/weakness/tingling in arms/legs/face.      PMH: PE, COPD  Surgical Hx: none reported    Social HX  T: quit Oct 2024  A: Denies  D: denies     Social Drivers of Health     Tobacco Use: Medium Risk (4/18/2025)    Patient History     Smoking Tobacco Use: Former     Smokeless Tobacco Use: Never     Passive Exposure: Not on file   Alcohol Use: Not on file   Financial Resource Strain: Not on file   Food Insecurity: Not on file   Transportation Needs: Not on file   Physical Activity: Not on file   Stress: Not on file   Social Connections: Not on file   Intimate Partner Violence: Not on file   Depression: Not on file   Housing Stability: Not on file   Utilities: Not on file   Digital Equity: Not on file   Health Literacy: Not on file         Visit Vitals  /88   Pulse 89   Ht 1.626 m (5' 4\")   Wt 90.7 kg (200 lb)   SpO2 95%   BMI 34.33 kg/m²   OB Status Postmenopausal   Smoking Status Former   BSA 2.02 m²     PHYSICAL EXAM   Physical Exam       General: NAD. NCAT. Aox3   HEENT: PERRLA. EOMI. MMM. Nares patent bl.  Cardiovascular: RRR. No MRG. S1/S2 wnl.   Respiratory: CTABL. No acute respiratory " distress.   GI: Soft, NT abdomen. BS present x 4.   MSK: ROM x 4. CTLS non-tender.   Extremities: trace BLLE edema with venous stasis superficial veins seen. Cap refill < 2 sec.   Skin: No rashes or bruises. LUE lesion near posterior axillary. Looks like it is depressed with ulcerating center.   Neuro: Aox3. Cranial Nerves grossly intact. Motor/sensory wnl.   Psych: Mood wnl.      REVIEW OF SYSTEMS   ROS IN HPI     No Known Allergies    Current Outpatient Medications   Medication Sig Dispense Refill    albuterol 90 mcg/actuation inhaler Inhale 2 puffs every 6 hours if needed for wheezing or shortness of breath. 18 g 2    diclofenac sodium (Voltaren) 1 % gel Apply 4.5 inches (4 g) topically 4 times a day. (Patient taking differently: Apply 4.5 inches (4 g) topically if needed.) 100 g 1    Eliquis 5 mg tablet TAKE 2 TABLETS (10 MG) BY MOUTH 2 TIMES A DAY. 120 tablet 1    guaiFENesin (Mucinex) 600 mg 12 hr tablet Take 1 tablet (600 mg) by mouth 2 times a day.      ipratropium-albuteroL (Duo-Neb) 0.5-2.5 mg/3 mL nebulizer solution INHALE 3 ML VIA NEBULIZER EVERY 6HOURS AS NEEDED FOR SHORTNESS OF BREATH 180 mL 1    Trelegy Ellipta 100-62.5-25 mcg blister with device INHALE 1 PUFF BY MOUTH ONCE DAILY 60 each 1    furosemide (Lasix) 20 mg tablet Take 1 tablet (20 mg) by mouth once daily for 5 days. (Patient not taking: Reported on 11/11/2024) 5 tablet 0     No current facility-administered medications for this visit.       Objective     No visits with results within 4 Month(s) from this visit.   Latest known visit with results is:   Hospital Outpatient Visit on 11/11/2024   Component Date Value Ref Range Status    Case Report 11/11/2024    Final                    Value:Surgical Pathology                                Case: [id number]                                  Authorizing Provider:  Cristhian Arthur MD         Collected:           11/11/2024 0959              Ordering Location:     [facility]     "Received:            11/11/2024 1102              Pathologist:           Jordan Remy MD                                                                  Specimens:   A) - COLON - TRANSVERSE POLYP, COLD BIOPSY                                                          B) - ASCENDING COLON POLYP, COLD SNARE                                                              C) - RECTUM POLYPECTOMY, COLD SNARE                                                        FINAL DIAGNOSIS 11/11/2024    Final                    Value:A. TRANSVERSE COLON POLYP:   -- COLONIC MUCOSA WITH HYPERPLASTIC TYPE CHANGES  -- NO DYSPLASTIC EPITHELIUM IDENTIFIED     B. ASCENDING COLON POLYP:   -- TUBULAR ADENOMA     C. RECTUM POLYP:      -- HYPERPLASTIC POLYP         11/11/2024    Final                    Value:By the signature on this report, the individual or group listed as making the Final Interpretation/Diagnosis certifies that they have reviewed this case.       Clinical History 11/11/2024    Final                    Value:Diagnosis: Z12.11 - Encounter for screening for malignant neoplasm of colon [ICD-10-CM]      Gross Description 11/11/2024    Final                    Value:A: Received in formalin, labeled with the patient's name and hospital number and \"1, cold BX.\", are multiple fragments of tan, soft tissue aggregating to 1.0 x 0.2 x 0.2 cm. The specimen is submitted in toto in one cassette.  MRS  B: Received in formalin, labeled with the patient's name and hospital number and \"2, cold snare\", are 2 fragments of tan, soft tissue aggregating to 0.8 x 0.3 x 0.2 cm. The specimen is submitted in toto in one cassette.  MRS  C: Received in formalin, labeled with the patient's name and hospital number and \"3, cold snare\", is a fragment of tan, soft tissue measuring 0.3 x 0.2 x 0.2 cm. The specimen is submitted in toto in one cassette.  MRS         Radiology: Reviewed imaging in powerchart.  No results found.    No family history on file.  Social " History     Socioeconomic History    Marital status:    Tobacco Use    Smoking status: Former     Types: Cigarettes    Smokeless tobacco: Never   Substance and Sexual Activity    Alcohol use: Yes     Comment: glass of wine occasionally     No past medical history on file.  No past surgical history on file.    Charting was completed using voice recognition technology and may include unintended errors.

## 2025-04-19 LAB
BASOPHILS # BLD AUTO: 59 CELLS/UL (ref 0–200)
BASOPHILS NFR BLD AUTO: 0.9 %
EOSINOPHIL # BLD AUTO: 191 CELLS/UL (ref 15–500)
EOSINOPHIL NFR BLD AUTO: 2.9 %
ERYTHROCYTE [DISTWIDTH] IN BLOOD BY AUTOMATED COUNT: 13 % (ref 11–15)
HCT VFR BLD AUTO: 39 % (ref 35–45)
HGB BLD-MCNC: 12.9 G/DL (ref 11.7–15.5)
LYMPHOCYTES # BLD AUTO: 442 CELLS/UL (ref 850–3900)
LYMPHOCYTES NFR BLD AUTO: 6.7 %
MCH RBC QN AUTO: 29.4 PG (ref 27–33)
MCHC RBC AUTO-ENTMCNC: 33.1 G/DL (ref 32–36)
MCV RBC AUTO: 88.8 FL (ref 80–100)
MONOCYTES # BLD AUTO: 937 CELLS/UL (ref 200–950)
MONOCYTES NFR BLD AUTO: 14.2 %
NEUTROPHILS # BLD AUTO: 4970 CELLS/UL (ref 1500–7800)
NEUTROPHILS NFR BLD AUTO: 75.3 %
PLATELET # BLD AUTO: 304 THOUSAND/UL (ref 140–400)
PMV BLD REES-ECKER: 9.8 FL (ref 7.5–12.5)
RBC # BLD AUTO: 4.39 MILLION/UL (ref 3.8–5.1)
TSH SERPL-ACNC: 3.41 MIU/L (ref 0.4–4.5)
WBC # BLD AUTO: 6.6 THOUSAND/UL (ref 3.8–10.8)

## 2025-04-21 ENCOUNTER — PATIENT MESSAGE (OUTPATIENT)
Dept: PRIMARY CARE | Facility: CLINIC | Age: 65
End: 2025-04-21
Payer: COMMERCIAL

## 2025-05-08 DIAGNOSIS — I26.99 OTHER ACUTE PULMONARY EMBOLISM WITHOUT ACUTE COR PULMONALE: ICD-10-CM

## 2025-05-08 RX ORDER — APIXABAN 5 MG/1
TABLET, FILM COATED ORAL
Qty: 120 TABLET | Refills: 1 | Status: SHIPPED | OUTPATIENT
Start: 2025-05-08

## 2025-05-12 ENCOUNTER — PATIENT MESSAGE (OUTPATIENT)
Dept: PRIMARY CARE | Facility: CLINIC | Age: 65
End: 2025-05-12
Payer: COMMERCIAL

## 2025-06-10 ENCOUNTER — APPOINTMENT (OUTPATIENT)
Dept: DERMATOLOGY | Facility: CLINIC | Age: 65
End: 2025-06-10
Payer: COMMERCIAL

## 2025-06-10 VITALS — HEART RATE: 84 BPM | SYSTOLIC BLOOD PRESSURE: 156 MMHG | DIASTOLIC BLOOD PRESSURE: 87 MMHG

## 2025-06-10 DIAGNOSIS — C44.619 BASAL CELL CARCINOMA (BCC) OF SKIN OF LEFT UPPER EXTREMITY INCLUDING SHOULDER: ICD-10-CM

## 2025-06-10 NOTE — PROGRESS NOTES
Mohs Surgery Operative Note    Date of Surgery:  6/10/2025  Surgeon:  Rashaad Beltran MD PhD  Office Location: [address]  [address]  [facility] [address]  Dept: [phone]  Dept Fax: [phone]  Referring Provider: Ran Lopez MD  [address] [facility] [address]  [place] [address]      Assessment/Plan   Pre-procedure:   Obtained informed consent: written from patient  The surgical site was identified and confirmed with the patient.     Intra-operative:   Audible time out called at : 1:20 PM 06/10/25  by: Marilee Padilla RN   Verified patient name, birthdate, site, specimen bottle label & requisition.    The planned procedure(s) was again reviewed with the patient. The risks of bleeding, infection, nerve damage and scarring were reviewed. Written authorization was obtained. The patient identity, surgical site, and planned procedure(s) were verified. The provider acted as both surgeon and pathologist.     BASAL CELL CARCINOMA (BCC) OF SKIN OF LEFT UPPER EXTREMITY INCLUDING SHOULDER  Left Posterolateral Upper Arm  Mohs surgery    Consent obtained: written    Universal Protocol:  Procedure explained and questions answered to patient or proxy's satisfaction: Yes    Test results available and properly labeled: Yes    Pathology report reviewed: Yes    External notes reviewed: Yes    Photo or diagram used for site identification: Yes    Site/side marked: Yes    Slide independently reviewed by Mohs surgeon: Yes    Immediately prior to procedure a time out was called: Yes    Patient identity confirmed: verbally with patient  Preparation: Patient was prepped and draped in usual sterile fashion      Anticoagulation:  Is the patient taking prescription anticoagulant and/or aspirin prescribed/recommended by a physician? No    Was the anticoagulation regimen changed prior to Mohs? No      Anesthesia:  Anesthesia method: local infiltration  Local anesthetic: lidocaine 1% WITH  epi    Procedure Details:  Case ID Number: [id number]  Biopsy accession number: [id number]  Date of biopsy: 4/23/2025  Frozen section biopsy performed: No    Specimen debulked: Yes (nBCC)    Pre-Op diagnosis: basal cell carcinoma  BCC subtype: nodular  Surgical site (from skin exam): Left Posterolateral Upper Arm  Pre-operative length (cm): 1.6  Pre-operative width (cm): 2.2  Indications for Mohs surgery: tumor size greater than 2 cm  Previously treated? No      Micrographic Surgery Details:  Post-operative length (cm): 2.7  Post-operative width (cm): 2.1  Number of Mohs stages: 1    Stage 1     Comments: The patient was brought into the operating room and placed in the procedure chair in the appropriate position.  The area positive by previous biopsy was identified and confirmed with the patient. The area of clinically obvious tumor was debulked using a curette and/or scalpel as needed. An incision was made following the Mohs approach through the skin. The specimen was taken to the lab, divided into 2 piece(s) and appropriately chromacoded and processed.                 Tumor features identified on Mohs section: no tumor identified    Depth of defect: subcutaneous fat    Patient tolerance of procedure: tolerated well, no immediate complications    Reconstruction:  Was the defect reconstructed? Yes    Was reconstruction performed by the same Mohs surgeon? Yes    Setting of reconstruction: outpatient office  When was reconstruction performed? same day  Type of reconstruction: linear  Linear reconstruction: intermediate  Length of linear repair (cm): 6  Subcutaneous Layers (Deep Stitches)   Suture size:  3-0  Suture type:  Vicryl  Stitches:  Buried vertical mattress  Fine/surface layer approximation (top stitches)   Epidermal/Superficial suture size:  4-0  Epidermal/Superficial suture type:  Prolene  Stitches: simple running    Hemostasis achieved with: electrodesiccation  Outcome: patient tolerated procedure well with  no complications    Post-procedure details: sterile dressing applied and wound care instructions given    Dressing type: Hypafix and pressure dressing      Intermediate Linear Repair:  Given the location and size of the defect, it was determined that an intermediate layered linear closure was required to restore normal anatomy and function. The repair is an intermediate closure as two layers of sutures were required. The defect was undermined extensively at the level of the subcutaneous plane. Standing cutaneous cones were removed using Burow's triangles. The wound edges were brought into close approximation with buried vertical mattress sutures. The remainder of the wound was then closed with epidermal top sutures.              The final repair measured 6 cm    Wound care was discussed, and the patient was given written post-operative wound care instructions.      The patient will follow up with Rashaad Beltran MD PhD as needed for any post operative problems or concerns, and will follow up with their primary dermatologist as scheduled.       Marilee MCKEON RN am scribing for, and in the presence of Rashaad Beltran MD PhD    Rashaad MCKEON MD, PhD, personally performed the services described in the documentation as scribed by Marilee Padilla RN in my presence, and confirm it is both accurate and complete.

## 2025-06-10 NOTE — PROGRESS NOTES
Office Visit Note  Date: 6/10/2025  Surgeon:  Rashaad Beltran MD PhD  Office Location:  [address]  [address]  [facility] [address]  Dept: [phone]  Dept Fax: [phone]  Referring Provider: Ran Lopez MD  [address]  [address]  [place] [id number]    Holli Reyes is a 64 y.o. female who presents for the following: MOHS Surgery (Left posterolateral upper arm, BCC)    According to the patient, the lesion has been present for approximately greater than 1 year at the time of diagnosis.  The lesion is itchy and painful.  The lesion has not been treated previously.    The patient does not have a pacemaker / defibrillator.  The patient does not have a heart valve / joint replacement.    The patient is on blood thinners.  The patient does not have a history of hepatitis B or C.  The patient does not have a history of HIV.  The patient does not have a history of immunosuppression (e.g. organ transplantation, malignancy, medications)    Review of Systems:  No other skin or systemic complaints other than what is documented elsewhere in the note.    MEDICAL HISTORY: clinically relevant history including significant past medical history, medications and allergies was reviewed and documented in Epic.    Objective   Well appearing patient in no apparent distress; mood and affect are within normal limits.  Vital signs: See record.  Noted on the   Left Posterolateral Upper Arm  Is a 1.6 x 2.2 cm scar      The patient confirmed the identified site.    Discussion:  The nature of the diagnosis was explained. The lesion is a skin cancer.  It has a risk of local growth and distant spread. The condition is associated with sun exposure.  Warning signs of non-melanoma skin cancer discussed. Patient was instructed to perform monthly self skin examination.  We recommended that the patient have regular full skin exams given an increased risk of subsequent skin  cancers. The patient was instructed to use sun protective behaviors including use of broad spectrum sunscreens and sun protective clothing to reduce risk of skin cancers.      Risks, benefits, side effects of Mohs surgery were discussed with patient and the patient voiced understanding.  It was explained that even though the cure rate of Mohs is very high it is not 100%. Risks of surgery including but not limited to bleeding, infection, numbness, nerve damage, and scar were reviewed.  Discussion included wound care requirements, activity restrictions, likely scar outcome and time to heal.     After Mohs surgery, the defect may need to be repaired surgically and the scar may be longer than the original lesion.  Reconstruction options, risks, and benefits were reviewed including second intention healing, linear repair (4-1 ratio was explained), local flaps, skin grafts, cartilage grafts and interpolation flaps (the need for multiple surgeries was explained). Possible outcomes were reviewed including likely scar appearance, failure of flap survival, infection, bleeding and the need for revision surgery.     The pathology was reviewed, the photograph was reviewed, and the referring physician's note was reviewed.    Patient elected for Mohs surgery.     IMarilee RN am scribing for, and in the presence of Rashaad Beltran MD PhD    Rashaad MCKEON MD, PhD, personally performed the services described in the documentation as scribed by Marilee Padilla RN in my presence, and confirm it is both accurate and complete.

## 2025-06-20 ENCOUNTER — APPOINTMENT (OUTPATIENT)
Dept: DERMATOLOGY | Facility: CLINIC | Age: 65
End: 2025-06-20
Payer: COMMERCIAL

## 2025-06-20 DIAGNOSIS — Z48.02 ENCOUNTER FOR REMOVAL OF SUTURES: ICD-10-CM

## 2025-06-20 PROCEDURE — 99024 POSTOP FOLLOW-UP VISIT: CPT | Performed by: DERMATOLOGY

## 2025-06-20 NOTE — PROGRESS NOTES
Office Follow Up Note    Visit Summary  Chief Complaint    1. Complaint Wound check.    Estephanie Reyes is a 64 y.o. female who presents for 10 day follow up after surgery for a basal cell carcinoma. The patient has no concerns today.     Location Operation site location: Left posterior upper arm    On exam,  Ms. Reyes is well-appearing and in no apparent distress. The surgical site appears clean with minimal to no erythema. No tenderness and good wound edge apposition.    Assessment and Plan:  History of skin cancer requiring ongoing monitoring for recurrence and additional lesion development.   The patient was reassured that the wound is healing appropriately.   Sutures were removed without complication today.  The dressing was removed, the wound cleaned a a new dressing reapplied.

## 2025-07-25 ENCOUNTER — OFFICE VISIT (OUTPATIENT)
Dept: DERMATOLOGY | Facility: CLINIC | Age: 65
End: 2025-07-25
Payer: MEDICARE

## 2025-07-25 DIAGNOSIS — Z51.89 VISIT FOR WOUND CHECK: ICD-10-CM

## 2025-07-25 NOTE — PROGRESS NOTES
Office Follow Up Note    Visit Summary  Chief Complaint    1. Complaint Wound check.    Estephanie Reyes is a 64 y.o. female who presents for 6 week follow up after surgery for a basal cell carcinoma. The patient has concerns about 3 areas along suture line.     Location Operation site location: left posterior upper arm         On exam,  Ms. Reyes is well-appearing and in no apparent distress. The surgical site appears clean with minimal to no erythema. No tenderness and good wound edge apposition.    Assessment and Plan:  Three spitting suture resolving on their own.  The patient was reassured that the wound is healing appropriately.   The dressing was removed, the wound cleaned and a new dressing reapplied.     The patient was instructed to call with any further concerns. The patient will return as needed.     IMarilee RN am scribing for, and in the presence of Rashaad Beltran MD PhD

## 2025-07-30 ENCOUNTER — HOSPITAL ENCOUNTER (OUTPATIENT)
Dept: RADIOLOGY | Facility: CLINIC | Age: 65
Discharge: HOME | End: 2025-07-30
Payer: COMMERCIAL

## 2025-07-30 DIAGNOSIS — R92.8 ABNORMAL MAMMOGRAM: ICD-10-CM

## 2025-07-30 PROCEDURE — 77065 DX MAMMO INCL CAD UNI: CPT | Mod: RT

## 2025-07-30 PROCEDURE — 77065 DX MAMMO INCL CAD UNI: CPT | Mod: RIGHT SIDE | Performed by: RADIOLOGY

## 2025-07-30 PROCEDURE — G0279 TOMOSYNTHESIS, MAMMO: HCPCS | Mod: RIGHT SIDE | Performed by: RADIOLOGY

## 2025-07-31 ENCOUNTER — RESULTS FOLLOW-UP (OUTPATIENT)
Dept: PRIMARY CARE | Facility: CLINIC | Age: 65
End: 2025-07-31
Payer: COMMERCIAL

## 2025-08-02 DIAGNOSIS — J44.9 CHRONIC OBSTRUCTIVE PULMONARY DISEASE, UNSPECIFIED COPD TYPE (MULTI): ICD-10-CM

## 2025-08-02 DIAGNOSIS — I26.99 OTHER ACUTE PULMONARY EMBOLISM WITHOUT ACUTE COR PULMONALE: ICD-10-CM

## 2025-08-04 ENCOUNTER — PATIENT MESSAGE (OUTPATIENT)
Dept: PRIMARY CARE | Facility: CLINIC | Age: 65
End: 2025-08-04
Payer: COMMERCIAL

## 2025-08-04 DIAGNOSIS — J44.9 CHRONIC OBSTRUCTIVE PULMONARY DISEASE, UNSPECIFIED COPD TYPE (MULTI): ICD-10-CM

## 2025-08-04 RX ORDER — APIXABAN 5 MG/1
10 TABLET, FILM COATED ORAL
Qty: 120 TABLET | Refills: 1 | Status: SHIPPED | OUTPATIENT
Start: 2025-08-04 | End: 2025-08-07

## 2025-08-04 RX ORDER — FLUTICASONE FUROATE, UMECLIDINIUM BROMIDE AND VILANTEROL TRIFENATATE 100; 62.5; 25 UG/1; UG/1; UG/1
1 POWDER RESPIRATORY (INHALATION) DAILY
Qty: 60 EACH | Refills: 1 | Status: SHIPPED | OUTPATIENT
Start: 2025-08-04 | End: 2025-08-05 | Stop reason: SDUPTHER

## 2025-08-05 RX ORDER — FLUTICASONE FUROATE, UMECLIDINIUM BROMIDE AND VILANTEROL TRIFENATATE 100; 62.5; 25 UG/1; UG/1; UG/1
1 POWDER RESPIRATORY (INHALATION) DAILY
Qty: 60 EACH | Refills: 1 | Status: SHIPPED | OUTPATIENT
Start: 2025-08-05

## 2025-08-07 DIAGNOSIS — I26.99 OTHER ACUTE PULMONARY EMBOLISM WITHOUT ACUTE COR PULMONALE: ICD-10-CM

## 2025-08-30 DIAGNOSIS — J44.9 CHRONIC OBSTRUCTIVE PULMONARY DISEASE, UNSPECIFIED COPD TYPE (MULTI): ICD-10-CM

## 2025-08-30 DIAGNOSIS — I26.99 OTHER ACUTE PULMONARY EMBOLISM WITHOUT ACUTE COR PULMONALE: ICD-10-CM

## 2025-09-02 RX ORDER — ALBUTEROL SULFATE 90 UG/1
2 INHALANT RESPIRATORY (INHALATION) EVERY 6 HOURS PRN
Qty: 8 G | Refills: 2 | Status: SHIPPED | OUTPATIENT
Start: 2025-09-02

## 2025-10-16 ENCOUNTER — APPOINTMENT (OUTPATIENT)
Dept: PRIMARY CARE | Facility: CLINIC | Age: 65
End: 2025-10-16
Payer: COMMERCIAL